# Patient Record
Sex: FEMALE | Race: WHITE | URBAN - METROPOLITAN AREA
[De-identification: names, ages, dates, MRNs, and addresses within clinical notes are randomized per-mention and may not be internally consistent; named-entity substitution may affect disease eponyms.]

---

## 2024-01-27 ENCOUNTER — OFFICE VISIT (OUTPATIENT)
Dept: URGENT CARE | Facility: CLINIC | Age: 59
End: 2024-01-27
Payer: COMMERCIAL

## 2024-01-27 VITALS
WEIGHT: 160 LBS | DIASTOLIC BLOOD PRESSURE: 92 MMHG | OXYGEN SATURATION: 98 % | HEART RATE: 86 BPM | HEIGHT: 61 IN | TEMPERATURE: 98 F | BODY MASS INDEX: 30.21 KG/M2 | RESPIRATION RATE: 20 BRPM | SYSTOLIC BLOOD PRESSURE: 180 MMHG

## 2024-01-27 DIAGNOSIS — I10 UNCONTROLLED HYPERTENSION: Primary | ICD-10-CM

## 2024-01-27 DIAGNOSIS — Z91.148 NONADHERENCE TO MEDICATION: ICD-10-CM

## 2024-01-27 DIAGNOSIS — Z76.0 MEDICATION REFILL: ICD-10-CM

## 2024-01-27 DIAGNOSIS — M54.2 MUSCULOSKELETAL NECK PAIN: ICD-10-CM

## 2024-01-27 PROCEDURE — 99203 OFFICE O/P NEW LOW 30 MIN: CPT | Mod: S$GLB,,, | Performed by: FAMILY MEDICINE

## 2024-01-27 RX ORDER — IRBESARTAN AND HYDROCHLOROTHIAZIDE 300; 12.5 MG/1; MG/1
1 TABLET, FILM COATED ORAL DAILY
Qty: 90 TABLET | Refills: 0 | Status: SHIPPED | OUTPATIENT
Start: 2024-01-27

## 2024-01-27 RX ORDER — IRBESARTAN AND HYDROCHLOROTHIAZIDE 300; 12.5 MG/1; MG/1
1 TABLET, FILM COATED ORAL
COMMUNITY
Start: 2023-09-28

## 2024-01-27 NOTE — PATIENT INSTRUCTIONS
Keep upcoming primary care appointment 1/29/24  Low salt diet  Adhere to blood pressure medication      Activity modification for neck pain; ensure proper sleeping position. Heat/ cold therapy; topical Biofreeze, Aspercreme, Lidocaine OTC patches, Diclofenac gel. Tylenol and/or NSAIDs as needed. Careful with GI and renal adverse events with NSAIDs.   Exercises for strengthening and increased range of motion; supervised vs home vs combination physical therapy. May consider imaging and/or specialist consultation if symptoms do not improve.

## 2024-01-27 NOTE — PROGRESS NOTES
Subjective:      Patient ID: Karena Welch is a 58 y.o. female.      Chief Complaint: Hypertension    Pt states that she has a tolerable headache because her blood pressure is high. She has not been taking BP medication as prescribed, often missing days and completely ran out of her medication since last week. She admits to eating salt foods.      Hypertension  This is a new problem. Associated symptoms include headaches. Pertinent negatives include no anxiety, blurred vision, chest pain, malaise/fatigue, orthopnea, palpitations, peripheral edema, PND, shortness of breath or sweats. There are no associated agents to hypertension. There are no known risk factors for coronary artery disease. There is no history of angina, kidney disease, CAD/MI, CVA, heart failure, left ventricular hypertrophy or retinopathy. There is no history of chronic renal disease, coarctation of the aorta, hyperaldosteronism, hypercortisolism, hyperparathyroidism, a hypertension causing med, pheochromocytoma, renovascular disease, sleep apnea or a thyroid problem.       Constitution: Negative for activity change, appetite change, chills, fatigue, fever and generalized weakness.   HENT:  Negative for congestion, postnasal drip and sinus pressure.    Neck: Negative for neck swelling.   Cardiovascular:  Negative for chest pain, leg swelling, palpitations, sob on exertion and passing out.   Eyes:  Negative for vision loss and blurred vision.   Respiratory:  Negative for chest tightness, cough and shortness of breath.    Gastrointestinal:  Negative for nausea and vomiting.   Genitourinary:  Negative for dysuria.   Skin:  Negative for rash.   Neurological:  Positive for headaches. Negative for passing out, altered mental status and numbness.   Psychiatric/Behavioral:  Negative for altered mental status, confusion and agitation.       Objective:     Vitals:    01/27/24 1339   BP: (!) 180/92   BP Location: Left arm   Patient Position: Sitting   BP Method:  "Medium (Automatic)   Pulse: 86   Resp: 20   Temp: 97.8 °F (36.6 °C)   TempSrc: Oral   SpO2: 98%   Weight: 72.6 kg (160 lb)   Height: 5' 1" (1.549 m)      Physical Exam   Constitutional: She is oriented to person, place, and time. She appears well-developed. She is cooperative.  Non-toxic appearance. She does not appear ill. No distress.   HENT:   Head: Normocephalic and atraumatic.   Ears:   Right Ear: Hearing and tympanic membrane normal.   Left Ear: Hearing and tympanic membrane normal.   Nose: Nose normal. No mucosal edema or nasal deformity. No epistaxis. Right sinus exhibits no maxillary sinus tenderness and no frontal sinus tenderness. Left sinus exhibits no maxillary sinus tenderness and no frontal sinus tenderness.   Mouth/Throat: Uvula is midline, oropharynx is clear and moist and mucous membranes are normal. No trismus in the jaw. Normal dentition. No uvula swelling.   Eyes: Conjunctivae and lids are normal. Right eye exhibits no discharge. Left eye exhibits no discharge. No scleral icterus.      Comments: Vision grossly intact by finger controntation   Neck: Trachea normal and phonation normal. Neck supple.      Comments: Decreased bilateral lateral flexion due to reported muscle stiffness from poor sleeping position   Cardiovascular: Normal rate, regular rhythm, normal heart sounds and normal pulses.   Pulmonary/Chest: Effort normal and breath sounds normal. No respiratory distress.   Abdominal: Normal appearance and bowel sounds are normal. She exhibits no distension and no mass. Soft. There is no abdominal tenderness.   Musculoskeletal:      Right lower leg: No edema.      Left lower leg: No edema.   Neurological: no focal deficit. She is alert and oriented to person, place, and time. She exhibits normal muscle tone. Coordination normal.   Skin: Skin is warm, intact and not diaphoretic.   Psychiatric: Her speech is normal and behavior is normal. Judgment and thought content normal.   Nursing note and " vitals reviewed.      Assessment:     1. Uncontrolled hypertension    2. Medication refill    3. Nonadherence to medication    4. Musculoskeletal neck pain        Plan:       Uncontrolled hypertension  -     irbesartan-hydrochlorothiazide (AVALIDE) 300-12.5 mg per tablet; Take 1 tablet by mouth once daily.  Dispense: 90 tablet; Refill: 0    2. Medication refill  -     irbesartan-hydrochlorothiazide (AVALIDE) 300-12.5 mg per tablet; Take 1 tablet by mouth once daily.  Dispense: 90 tablet; Refill: 0    3. Nonadherence to medication  Discussed importance of taking medication. The blood pressure readings appear to be above goal. There does not appear to be any symptoms such as chest pain, shortness of breath, palpitations, fatigue, syncope, seizures or SEVERE headaches. There are no new visual problems. Blood pressure above goal can lead to end organ damage.     4. Musculoskeletal neck pain  Started after poor sleeping position. No trauma. Manage conservatively.    Patient Instructions   Keep upcoming primary care appointment 1/29/24  Low salt diet  Adhere to blood pressure medication      Activity modification for neck pain; ensure proper sleeping position. Heat/ cold therapy; topical Biofreeze, Aspercreme, Lidocaine OTC patches, Diclofenac gel. Tylenol and/or NSAIDs as needed. Careful with GI and renal adverse events with NSAIDs.   Exercises for strengthening and increased range of motion; supervised vs home vs combination physical therapy. May consider imaging and/or specialist consultation if symptoms do not improve.